# Patient Record
Sex: FEMALE | Race: WHITE | Employment: UNEMPLOYED | URBAN - METROPOLITAN AREA
[De-identification: names, ages, dates, MRNs, and addresses within clinical notes are randomized per-mention and may not be internally consistent; named-entity substitution may affect disease eponyms.]

---

## 2022-07-14 ENCOUNTER — OFFICE VISIT (OUTPATIENT)
Dept: OBGYN CLINIC | Age: 57
End: 2022-07-14
Payer: COMMERCIAL

## 2022-07-14 VITALS — BODY MASS INDEX: 24.56 KG/M2 | WEIGHT: 138.6 LBS | HEIGHT: 63 IN

## 2022-07-14 DIAGNOSIS — Z01.419 WELL WOMAN EXAM: Primary | ICD-10-CM

## 2022-07-14 DIAGNOSIS — Z13.89 SCREENING FOR GENITOURINARY CONDITION: ICD-10-CM

## 2022-07-14 DIAGNOSIS — Z12.4 SCREENING FOR CERVICAL CANCER: ICD-10-CM

## 2022-07-14 DIAGNOSIS — Z11.51 SCREENING FOR HUMAN PAPILLOMAVIRUS (HPV): ICD-10-CM

## 2022-07-14 LAB
BILIRUBIN, URINE, POC: NEGATIVE
BLOOD URINE, POC: NEGATIVE
GLUCOSE URINE, POC: NEGATIVE
KETONES, URINE, POC: NEGATIVE
LEUKOCYTE ESTERASE, URINE, POC: NORMAL
NITRITE, URINE, POC: NEGATIVE
PH, URINE, POC: 7 (ref 4.6–8)
PROTEIN,URINE, POC: NEGATIVE
SPECIFIC GRAVITY, URINE, POC: 1.01 (ref 1–1.03)
URINALYSIS CLARITY, POC: CLEAR
URINALYSIS COLOR, POC: YELLOW
UROBILINOGEN, POC: NORMAL

## 2022-07-14 PROCEDURE — 81002 URINALYSIS NONAUTO W/O SCOPE: CPT | Performed by: OBSTETRICS & GYNECOLOGY

## 2022-07-14 PROCEDURE — 99396 PREV VISIT EST AGE 40-64: CPT | Performed by: OBSTETRICS & GYNECOLOGY

## 2022-07-14 NOTE — PROGRESS NOTES
HPI:  Ms. Pacheco is a 61 y.o.   OB History        4    Para   4    Term   4            AB        Living   4       SAB        IAB        Ectopic        Molar        Multiple        Live Births   4             who is here today for a new well woman exam. She complains of nothing. Patient has not been seen since 2018. She is postmenopausal and has had a tubal ligation. History of abnormal uterine bleeding post endometrial bx 2017 (normal). Date Performed Result   PAP 10/31/18  Negative, HPV Negative. Mammogram 22 Benign. Colonoscopy 11/10/14  wnl   Dexa NA NA         GYN History         No LMP recorded (lmp unknown). Patient is postmenopausal.  negative dysmenorrhea; negative postcoital bleeding    Past Medical History:  History reviewed. No pertinent past medical history. Past Surgical History:  Past Surgical History:   Procedure Laterality Date     SECTION         Allergies: Allergies   Allergen Reactions    Sulfa Antibiotics Rash       Medication History:  Current Outpatient Medications   Medication Sig Dispense Refill    vitamin D 25 MCG (1000 UT) CAPS Take by mouth      CALCIUM PO Take by mouth      Multiple Vitamin (MULTIVITAMIN PO) Take by mouth       No current facility-administered medications for this visit.        Social History:  Social History     Socioeconomic History    Marital status:      Spouse name: Not on file    Number of children: Not on file    Years of education: Not on file    Highest education level: Not on file   Occupational History    Not on file   Tobacco Use    Smoking status: Never Smoker    Smokeless tobacco: Never Used   Substance and Sexual Activity    Alcohol use: Not Currently    Drug use: Never    Sexual activity: Yes     Partners: Male     Birth control/protection: None   Other Topics Concern    Not on file   Social History Narrative    Not on file     Social Determinants of Health     Financial Resource Strain:     Difficulty of Paying Living Expenses: Not on file   Food Insecurity:     Worried About Running Out of Food in the Last Year: Not on file    Chaz of Food in the Last Year: Not on file   Transportation Needs:     Lack of Transportation (Medical): Not on file    Lack of Transportation (Non-Medical): Not on file   Physical Activity:     Days of Exercise per Week: Not on file    Minutes of Exercise per Session: Not on file   Stress:     Feeling of Stress : Not on file   Social Connections:     Frequency of Communication with Friends and Family: Not on file    Frequency of Social Gatherings with Friends and Family: Not on file    Attends [employer] Services: Not on file    Active Member of [address] [place] [address] [employer] or Organizations: Not on file    Attends Club or Organization Meetings: Not on file    Marital Status: Not on file   Intimate Partner Violence:     Fear of Current or Ex-Partner: Not on file    Emotionally Abused: Not on file    Physically Abused: Not on file    Sexually Abused: Not on file   Housing Stability:     Unable to Pay for Housing in the Last Year: Not on file    Number of [place] in the Last Year: Not on file    Unstable Housing in the Last Year: Not on file       Family History:  No family history on file. Review of Systems - General ROS: negative except for that discussed in HPI      ROS:  Feeling well. No dyspnea or chest pain on exertion. No abdominal pain, change in bowel habits, black or bloody stools. No urinary tract symptoms. No neurological complaints.     Objective:   Ht 5' 3\" (1.6 m)   Wt 138 lb 9.6 oz (62.9 kg)   LMP  (LMP Unknown)   Breastfeeding No   BMI 24.55 kg/m²     Results for orders placed or performed in visit on 07/14/22   AMB POC URINALYSIS DIP STICK MANUAL W/O MICRO   Result Value Ref Range    Color (UA POC) Yellow     Clarity (UA POC) Clear     Glucose, Urine, POC Negative Negative    Bilirubin, Urine, POC Negative Negative    KETONES, Urine, POC Negative Negative    Specific Gravity, Urine, POC 1.015 1.001 - 1.035    Blood (UA POC) Negative Negative    pH, Urine, POC 7.0 4.6 - 8.0    Protein, Urine, POC Negative Negative    Urobilinogen, POC Normal     Nitrite, Urine, POC Negative Negative    Leukocyte Esterase, Urine, POC 1+ Negative        The patient appears well, alert, oriented x 3, in no distress. ENT normal.  Neck supple. No adenopathy or thyromegaly. Lungs:  clear, good air entry, no wheezes, rhonchi or rales. Heart:  S1 and S2 normal, no murmurs, regular rate and rhythm. Abdomen:  soft without tenderness, guarding, mass or organomegaly. Extremities show no edema, normal peripheral pulses. Neurological is normal, no focal findings. BREAST EXAM: breasts appear normal, no suspicious masses, no skin or nipple changes or axillary nodes, symmetric fibrous changes bilaterally    PELVIC EXAM: External genitalia is within normal limits, urethra, urethra meatus and bladder are midline well supported. Vagina is well rugated, Cervix comes into full view and is within normal limits. Pap done  Uterus is 6 week size, no ovarian masses palpated    Assessment/Plan:      Diagnosis Orders   1. Well woman exam  AMB POC URINALYSIS DIP STICK MANUAL W/O MICRO    PAP IG, HPV Rfx HPV 16/18,45   2. Screening for human papillomavirus (HPV)  PAP IG, HPV Rfx HPV 16/18,45   3. Screening for cervical cancer  PAP IG, HPV Rfx HPV 16/18,45   4. Screening for genitourinary condition  AMB POC URINALYSIS DIP STICK MANUAL W/O MICRO     Encounter Diagnoses   Name Primary?  Well woman exam Yes    Screening for human papillomavirus (HPV)     Screening for cervical cancer     Screening for genitourinary condition      Orders Placed This Encounter   Procedures    PAP IG, HPV Rfx HPV 16/18,45     Standing Status:   Future     Standing Expiration Date:   7/14/2023     Order Specific Question:   Pap Source?           (Required)     Answer:   cervical     Order Specific Question:   Pap Source? (Required)     Answer:   endocervical     Order Specific Question:   Pap collection method? (Required     Answer:   broom     Order Specific Question:   Pap collection method? (Required     Answer:   brush     Order Specific Question:   Date of LMP? (Required)     Answer:   7/14/2022     Order Specific Question:   Previous Treatment? (Required)     Answer:   NONE    AMB POC URINALYSIS DIP STICK MANUAL W/O MICRO     Katelyn Cedeño was seen today for new patient and annual exam.    Diagnoses and all orders for this visit:    Well woman exam  -     Cancel: [facility] IRMA DIGITAL SCREEN BILATERAL; Future  -     AMB POC URINALYSIS DIP STICK MANUAL W/O MICRO  -     PAP IG, HPV Rfx HPV 16/18,45; Future    Screening for human papillomavirus (HPV)  -     PAP IG, HPV Rfx HPV 16/18,45; Future    Screening for cervical cancer  -     PAP IG, HPV Rfx HPV 16/18,45; Future    Screening for genitourinary condition  -     AMB POC URINALYSIS DIP STICK MANUAL W/O MICRO      No follow-ups on file.   current treatment plan is effective, no change in therapy; cont Ca++ exercise    Mammogram 2 weeks ago wnl  pap smear today  return annually or prn

## 2022-07-18 LAB
CYTOLOGIST CVX/VAG CYTO: NORMAL
CYTOLOGY CVX/VAG DOC THIN PREP: NORMAL
HPV APTIMA: NEGATIVE
Lab: NORMAL
PATH REPORT.FINAL DX SPEC: NORMAL
STAT OF ADQ CVX/VAG CYTO-IMP: NORMAL

## 2024-05-30 SDOH — ECONOMIC STABILITY: TRANSPORTATION INSECURITY
IN THE PAST 12 MONTHS, HAS LACK OF TRANSPORTATION KEPT YOU FROM MEETINGS, WORK, OR FROM GETTING THINGS NEEDED FOR DAILY LIVING?: NO

## 2024-05-30 SDOH — ECONOMIC STABILITY: FOOD INSECURITY: WITHIN THE PAST 12 MONTHS, YOU WORRIED THAT YOUR FOOD WOULD RUN OUT BEFORE YOU GOT MONEY TO BUY MORE.: NEVER TRUE

## 2024-05-30 SDOH — ECONOMIC STABILITY: INCOME INSECURITY: HOW HARD IS IT FOR YOU TO PAY FOR THE VERY BASICS LIKE FOOD, HOUSING, MEDICAL CARE, AND HEATING?: NOT HARD AT ALL

## 2024-05-30 SDOH — ECONOMIC STABILITY: HOUSING INSECURITY
IN THE LAST 12 MONTHS, WAS THERE A TIME WHEN YOU DID NOT HAVE A STEADY PLACE TO SLEEP OR SLEPT IN A SHELTER (INCLUDING NOW)?: NO

## 2024-05-30 SDOH — ECONOMIC STABILITY: FOOD INSECURITY: WITHIN THE PAST 12 MONTHS, THE FOOD YOU BOUGHT JUST DIDN'T LAST AND YOU DIDN'T HAVE MONEY TO GET MORE.: NEVER TRUE

## 2024-05-30 NOTE — PROGRESS NOTES
HPI:  Ms. Cuevas is a 58 y.o.   OB History          4    Para   4    Term   4            AB        Living   4         SAB        IAB        Ectopic        Molar        Multiple        Live Births   4             who is here today for a well woman exam. She complains of nothing.  No need for HRT   Date Performed Result   PAP 22 Negative, Negative   Mammogram 2024    Colonoscopy 11/10/2014 wnl   Dexa NA NA                 GYN History           No LMP recorded (lmp unknown). Patient is postmenopausal.   negative postcoital bleeding    Past Medical History:  Past Medical History:   Diagnosis Date    Adopted person        Past Surgical History:  Past Surgical History:   Procedure Laterality Date     SECTION      X3    COLONOSCOPY         Allergies:   Allergies   Allergen Reactions    Sulfa Antibiotics Rash       Medication History:  Current Outpatient Medications   Medication Sig Dispense Refill    vitamin D 25 MCG (1000 UT) CAPS Take by mouth      CALCIUM PO Take by mouth      Multiple Vitamin (MULTIVITAMIN PO) Take by mouth       No current facility-administered medications for this visit.       Social History:  Social History     Socioeconomic History    Marital status:      Spouse name: Not on file    Number of children: Not on file    Years of education: Not on file    Highest education level: Not on file   Occupational History    Not on file   Tobacco Use    Smoking status: Never    Smokeless tobacco: Never   Substance and Sexual Activity    Alcohol use: Not Currently    Drug use: Never    Sexual activity: Yes     Partners: Male     Birth control/protection: Surgical     Comment: tubal ligation   Other Topics Concern    Not on file   Social History Narrative    Not on file     Social Determinants of Health     Financial Resource Strain: Not on file   Food Insecurity: Not on file   Transportation Needs: Not on file   Physical Activity: Not on file   Stress: Not on

## 2024-05-31 ENCOUNTER — OFFICE VISIT (OUTPATIENT)
Dept: OBGYN CLINIC | Age: 59
End: 2024-05-31
Payer: COMMERCIAL

## 2024-05-31 VITALS
HEIGHT: 63 IN | DIASTOLIC BLOOD PRESSURE: 64 MMHG | BODY MASS INDEX: 24.63 KG/M2 | SYSTOLIC BLOOD PRESSURE: 118 MMHG | WEIGHT: 139 LBS

## 2024-05-31 DIAGNOSIS — Z11.51 SCREENING FOR HPV (HUMAN PAPILLOMAVIRUS): ICD-10-CM

## 2024-05-31 DIAGNOSIS — Z12.31 SCREENING MAMMOGRAM FOR BREAST CANCER: ICD-10-CM

## 2024-05-31 DIAGNOSIS — Z12.4 SCREENING FOR CERVICAL CANCER: ICD-10-CM

## 2024-05-31 DIAGNOSIS — Z01.419 WELL WOMAN EXAM: Primary | ICD-10-CM

## 2024-05-31 DIAGNOSIS — Z13.89 SCREENING FOR GENITOURINARY CONDITION: ICD-10-CM

## 2024-05-31 LAB
BILIRUBIN, URINE, POC: NEGATIVE
BLOOD URINE, POC: NEGATIVE
GLUCOSE URINE, POC: NEGATIVE
KETONES, URINE, POC: NEGATIVE
LEUKOCYTE ESTERASE, URINE, POC: NEGATIVE
NITRITE, URINE, POC: NEGATIVE
PH, URINE, POC: 6 (ref 4.6–8)
PROTEIN,URINE, POC: NEGATIVE
SPECIFIC GRAVITY, URINE, POC: 1.01 (ref 1–1.03)
URINALYSIS CLARITY, POC: CLEAR
URINALYSIS COLOR, POC: YELLOW
UROBILINOGEN, POC: NORMAL

## 2024-05-31 PROCEDURE — 81003 URINALYSIS AUTO W/O SCOPE: CPT | Performed by: OBSTETRICS & GYNECOLOGY

## 2024-05-31 PROCEDURE — 99396 PREV VISIT EST AGE 40-64: CPT | Performed by: OBSTETRICS & GYNECOLOGY

## 2024-06-11 LAB
C TRACH RRNA CVX QL NAA+PROBE: NEGATIVE
COLLECTION METHOD: NORMAL
CYTOLOGIST CVX/VAG CYTO: NORMAL
CYTOLOGY CVX/VAG DOC THIN PREP: NORMAL
HPV APTIMA: NEGATIVE
HPV GENOTYPE REFLEX: NORMAL
Lab: NORMAL
Lab: NORMAL
N GONORRHOEA RRNA CVX QL NAA+PROBE: NEGATIVE
PAP SOURCE: NORMAL
PATH REPORT.FINAL DX SPEC: NORMAL
STAT OF ADQ CVX/VAG CYTO-IMP: NORMAL
T VAGINALIS RRNA SPEC QL NAA+PROBE: NEGATIVE